# Patient Record
Sex: MALE | Race: WHITE | NOT HISPANIC OR LATINO | Employment: FULL TIME | URBAN - METROPOLITAN AREA
[De-identification: names, ages, dates, MRNs, and addresses within clinical notes are randomized per-mention and may not be internally consistent; named-entity substitution may affect disease eponyms.]

---

## 2020-07-23 DIAGNOSIS — R53.83 OTHER FATIGUE: ICD-10-CM

## 2020-07-23 DIAGNOSIS — R40.0 SOMNOLENCE: ICD-10-CM

## 2020-07-23 DIAGNOSIS — G47.10 HYPERSOMNIA, UNSPECIFIED: Primary | ICD-10-CM

## 2020-07-24 DIAGNOSIS — R53.83 OTHER FATIGUE: ICD-10-CM

## 2020-07-24 DIAGNOSIS — G47.10 HYPERSOMNIA, UNSPECIFIED: Primary | ICD-10-CM

## 2020-07-24 DIAGNOSIS — R40.0 SOMNOLENCE: ICD-10-CM

## 2020-07-24 DIAGNOSIS — Z01.818 OTHER SPECIFIED PRE-OPERATIVE EXAMINATION: ICD-10-CM

## 2020-07-27 DIAGNOSIS — Z01.818 OTHER SPECIFIED PRE-OPERATIVE EXAMINATION: ICD-10-CM

## 2020-08-07 ENCOUNTER — HOSPITAL ENCOUNTER (OUTPATIENT)
Dept: PREADMISSION TESTING | Facility: HOSPITAL | Age: 65
Discharge: HOME | End: 2020-08-07
Attending: OTOLARYNGOLOGY
Payer: COMMERCIAL

## 2020-08-07 DIAGNOSIS — Z01.818 OTHER SPECIFIED PRE-OPERATIVE EXAMINATION: ICD-10-CM

## 2020-08-07 PROCEDURE — U0003 INFECTIOUS AGENT DETECTION BY NUCLEIC ACID (DNA OR RNA); SEVERE ACUTE RESPIRATORY SYNDROME CORONAVIRUS 2 (SARS-COV-2) (CORONAVIRUS DISEASE [COVID-19]), AMPLIFIED PROBE TECHNIQUE, MAKING USE OF HIGH THROUGHPUT TECHNOLOGIES AS DESCRIBED BY CMS-2020-01-R: HCPCS

## 2020-08-08 LAB — SARS-COV-2 RNA RESP QL NAA+PROBE: NOT DETECTED

## 2020-08-10 ENCOUNTER — PROCEDURE VISIT (OUTPATIENT)
Dept: SLEEP MEDICINE | Facility: HOSPITAL | Age: 65
End: 2020-08-10
Attending: OTOLARYNGOLOGY
Payer: COMMERCIAL

## 2020-08-10 DIAGNOSIS — R40.0 SOMNOLENCE: ICD-10-CM

## 2020-08-10 DIAGNOSIS — R53.83 OTHER FATIGUE: ICD-10-CM

## 2020-08-10 DIAGNOSIS — G47.10 HYPERSOMNIA, UNSPECIFIED: ICD-10-CM

## 2020-08-10 PROCEDURE — 95810 POLYSOM 6/> YRS 4/> PARAM: CPT

## 2022-12-07 ENCOUNTER — OCCUPATIONAL HEALTH (OUTPATIENT)
Dept: URGENT CARE | Facility: CLINIC | Age: 67
End: 2022-12-07

## 2022-12-07 DIAGNOSIS — Z02.83 ENCOUNTER FOR DRUG SCREENING: Primary | ICD-10-CM

## 2022-12-07 LAB — BREATH ALCOHOL: 0

## 2022-12-07 PROCEDURE — 80305 MEDTOX HAIR COLLECTION ONLY: ICD-10-PCS | Mod: S$GLB,,, | Performed by: FAMILY MEDICINE

## 2022-12-07 PROCEDURE — 80305 DRUG TEST PRSMV DIR OPT OBS: CPT | Mod: S$GLB,,, | Performed by: FAMILY MEDICINE

## 2022-12-07 PROCEDURE — 82075 ASSAY OF BREATH ETHANOL: CPT | Mod: S$GLB,,, | Performed by: FAMILY MEDICINE

## 2022-12-07 PROCEDURE — 82075 POCT BREATH ALCOHOL TEST: ICD-10-PCS | Mod: S$GLB,,, | Performed by: FAMILY MEDICINE

## 2024-07-30 RX ORDER — GUAIFENESIN 1200 MG
TABLET, EXTENDED RELEASE 12 HR ORAL
COMMUNITY

## 2024-07-30 RX ORDER — TRAMADOL HYDROCHLORIDE 50 MG/1
TABLET ORAL
COMMUNITY

## 2024-07-30 RX ORDER — CEPHALEXIN 500 MG/1
CAPSULE ORAL
COMMUNITY

## 2024-07-30 RX ORDER — GABAPENTIN 300 MG/1
CAPSULE ORAL
COMMUNITY

## 2024-07-30 RX ORDER — AZELASTINE 1 MG/ML
SPRAY, METERED NASAL
COMMUNITY

## 2024-07-30 RX ORDER — ORAL SEMAGLUTIDE 7 MG/1
TABLET ORAL
COMMUNITY

## 2024-07-30 RX ORDER — HYDROCODONE BITARTRATE AND ACETAMINOPHEN 10; 325 MG/1; MG/1
TABLET ORAL
COMMUNITY

## 2024-07-30 RX ORDER — A/SINGAPORE/GP1908/2015 IVR-180 (AN A/MICHIGAN/45/2015 (H1N1)PDM09-LIKE VIRUS, A/HONG KONG/4801/2014, NYMC X-263B (H3N2) (AN A/HONG KONG/4801/2014-LIKE VIRUS), AND B/BRISBANE/60/2008, WILD TYPE (A B/BRISBANE/60/2008-LIKE VIRUS) 15; 15; 15 UG/.5ML; UG/.5ML; UG/.5ML
INJECTION, SUSPENSION INTRAMUSCULAR
COMMUNITY

## 2024-07-30 RX ORDER — PROMETHAZINE HYDROCHLORIDE AND DEXTROMETHORPHAN HYDROBROMIDE 6.25; 15 MG/5ML; MG/5ML
SYRUP ORAL
COMMUNITY

## 2024-07-30 RX ORDER — ATORVASTATIN CALCIUM 10 MG/1
TABLET, FILM COATED ORAL
COMMUNITY

## 2024-07-30 RX ORDER — ALBUTEROL SULFATE 90 UG/1
AEROSOL, METERED RESPIRATORY (INHALATION)
COMMUNITY

## 2024-07-30 RX ORDER — AZITHROMYCIN 250 MG/1
TABLET, FILM COATED ORAL
COMMUNITY

## 2024-07-30 RX ORDER — ESOMEPRAZOLE MAGNESIUM 40 MG/1
CAPSULE, DELAYED RELEASE ORAL
COMMUNITY

## 2024-07-30 RX ORDER — FENOFIBRATE 145 MG/1
TABLET, FILM COATED ORAL
COMMUNITY

## 2024-07-30 RX ORDER — TROSPIUM CHLORIDE 20 MG/1
TABLET, FILM COATED ORAL
COMMUNITY

## 2024-07-30 RX ORDER — SITAGLIPTIN AND METFORMIN HYDROCHLORIDE 500; 50 MG/1; MG/1
TABLET, FILM COATED ORAL
COMMUNITY

## 2024-07-30 RX ORDER — METFORMIN HYDROCHLORIDE 500 MG/1
TABLET, EXTENDED RELEASE ORAL
COMMUNITY

## 2024-07-30 RX ORDER — AMOXICILLIN 500 MG/1
TABLET, FILM COATED ORAL
COMMUNITY

## 2024-07-30 RX ORDER — CETIRIZINE HYDROCHLORIDE 10 MG/1
TABLET ORAL
COMMUNITY

## 2024-07-31 ENCOUNTER — HOSPITAL ENCOUNTER (OUTPATIENT)
Dept: RADIOLOGY | Facility: HOSPITAL | Age: 69
Discharge: HOME | End: 2024-07-31
Attending: INTERNAL MEDICINE
Payer: COMMERCIAL

## 2024-07-31 ENCOUNTER — OFFICE VISIT (OUTPATIENT)
Dept: PULMONOLOGY | Facility: CLINIC | Age: 69
End: 2024-07-31
Payer: COMMERCIAL

## 2024-07-31 VITALS
WEIGHT: 240.31 LBS | OXYGEN SATURATION: 98 % | DIASTOLIC BLOOD PRESSURE: 80 MMHG | SYSTOLIC BLOOD PRESSURE: 140 MMHG | HEART RATE: 85 BPM

## 2024-07-31 DIAGNOSIS — R05.3 CHRONIC COUGH: ICD-10-CM

## 2024-07-31 DIAGNOSIS — R05.9 COUGH, UNSPECIFIED TYPE: ICD-10-CM

## 2024-07-31 DIAGNOSIS — J32.9 CHRONIC SINUSITIS, UNSPECIFIED LOCATION: ICD-10-CM

## 2024-07-31 DIAGNOSIS — J34.89 OTHER SPECIFIED DISORDERS OF NOSE AND NASAL SINUSES: Primary | ICD-10-CM

## 2024-07-31 DIAGNOSIS — T78.40XA ALLERGY, INITIAL ENCOUNTER: ICD-10-CM

## 2024-07-31 DIAGNOSIS — G47.33 OSA (OBSTRUCTIVE SLEEP APNEA): ICD-10-CM

## 2024-07-31 PROCEDURE — 71046 X-RAY EXAM CHEST 2 VIEWS: CPT | Mod: 26,,, | Performed by: RADIOLOGY

## 2024-07-31 PROCEDURE — 71046 X-RAY EXAM CHEST 2 VIEWS: CPT | Mod: TC

## 2024-07-31 RX ORDER — PANTOPRAZOLE SODIUM 20 MG/1
20 TABLET, DELAYED RELEASE ORAL
COMMUNITY
Start: 2024-07-18

## 2024-07-31 RX ORDER — PIOGLITAZONEHYDROCHLORIDE 30 MG/1
30 TABLET ORAL
COMMUNITY
Start: 2024-07-05

## 2024-07-31 RX ORDER — COLESEVELAM 180 1/1
1250 TABLET ORAL 2 TIMES DAILY
COMMUNITY
Start: 2024-07-05

## 2024-07-31 RX ORDER — DAPAGLIFLOZIN 10 MG/1
10 TABLET, FILM COATED ORAL
COMMUNITY
Start: 2024-07-26

## 2024-07-31 RX ORDER — CODEINE PHOSPHATE AND GUAIFENESIN 10; 100 MG/5ML; MG/5ML
SOLUTION ORAL
COMMUNITY
Start: 2024-06-06

## 2024-07-31 RX ORDER — MELOXICAM 15 MG/1
15 TABLET ORAL DAILY PRN
COMMUNITY
Start: 2024-02-27

## 2024-07-31 RX ORDER — BUDESONIDE, GLYCOPYRROLATE, AND FORMOTEROL FUMARATE 160; 9; 4.8 UG/1; UG/1; UG/1
AEROSOL, METERED RESPIRATORY (INHALATION)
COMMUNITY
Start: 2024-06-06

## 2024-07-31 NOTE — ASSESSMENT & PLAN NOTE
Has postnasal drip  Continue antihistamine  Check CT sinus to ro obstruction or polyps  Check PFT, methacholine challenge  Check CBC with diff, IgE  Trial of reflux treatment x 2 weeks - call me then  RTC 1 month

## 2024-07-31 NOTE — PROGRESS NOTES
New Office Visit/Consultation Note *    Patient Name: Timbo Torres  MRN: [id number]  : 1955      Reason for visit: Cough, chronic     HPI:     2024 - He had URI in December and since then has had cough,  it has been stable over this period but not better.  He has some phlegm in the AM but mostly nonproductive.  He has postnasal drip (takes claritin most days, no nasal sprays).  No h/o asthma.  He did take allergy shots when he was younger.  No h/o smoking.  He has not had any recent CXR.  He has been on several inhalers with no help, he has not been on steroids but has noted some SOB with exertion.  No prior similar episode.  He does have Meenu and has CPAP but has trouble tolerating nCPAP due to nasal congestion.  He did have Covid about 2 months ago and was treated with paxlovid.      Past Medical History    Past Medical History:   Diagnosis Date    Chronic sinusitis, unspecified     CKD (chronic kidney disease)     Diabetes mellitus     Mixed hyperlipidemia     Sleep apnea, unspecified        Past Surgical History    Past Surgical History:   Procedure Laterality Date    CHOLECYSTECTOMY      ROTATOR CUFF REPAIR Left     TOTAL HIP ARTHROPLASTY Right     UVULOPALATOPHARYNGOPLASTY         Medications      Current Outpatient Medications:     albuterol (PROVENTIL/VENTOLIN HFA) 90 mcg/actuation inhaler, albuterol sulfate HFA 90 mcg/actuation aerosol inhaler, Disp: , Rfl:     colesevelam (WELCHOL) 625 mg tablet, Take 1,250 mg by mouth 2 (two) times daily., Disp: , Rfl:     FARXIGA 10 mg tablet, Take 10 mg by mouth., Disp: , Rfl:     fenofibrate (TRICOR) 145 MG tablet, fenofibrate nanocrystallized 145 mg tablet, Disp: , Rfl:     pioglitazone (ACTOS) 30 MG tablet, Take 30 mg by mouth., Disp: , Rfl:     semaglutide (RYBELSUS) 7 mg tablet, Rybelsus 7 mg tablet, Disp: , Rfl:     acetaminophen 325 mg Cap, acetaminophen 325 mg capsule  Take 2 capsules every 4-6 hours by oral route. (Patient not taking:  Reported on 7/31/2024), Disp: , Rfl:     amoxicillin (AMOXIL) 500 MG Tab, amoxicillin 500 mg tablet  TK 1 T PO  Q 8 H TAT (Patient not taking: Reported on 7/31/2024), Disp: , Rfl:     atorvastatin (LIPITOR) 10 MG tablet, atorvastatin 10 mg tablet (Patient not taking: Reported on 7/31/2024), Disp: , Rfl:     azelastine (ASTELIN) 137 mcg (0.1 %) nasal spray, azelastine 137 mcg (0.1 %) nasal spray aerosol  USE 1 SPRAY IN EACH NOSTRIL TWICE DAILY (Patient not taking: Reported on 7/31/2024), Disp: , Rfl:     azithromycin (Z-WENDY) 250 MG tablet, azithromycin 250 mg tablet (Patient not taking: Reported on 7/31/2024), Disp: , Rfl:     BREZTRI AEROSPHERE 160-9-4.8 mcg/actuation HFAA, Inhale into the lungs. (Patient not taking: Reported on 7/31/2024), Disp: , Rfl:     cephALEXin (KEFLEX) 500 MG capsule, cephalexin 500 mg capsule (Patient not taking: Reported on 7/31/2024), Disp: , Rfl:     cetirizine (ZYRTEC) 10 MG tablet, cetirizine 10 mg tablet  TAKE 1 TABLET BY MOUTH EVERY EVENING (Patient not taking: Reported on 7/31/2024), Disp: , Rfl:     esomeprazole (NEXIUM) 40 MG capsule, esomeprazole magnesium 40 mg capsule,delayed release (Patient not taking: Reported on 7/31/2024), Disp: , Rfl:     flu vac 2020 65up-[id number],PF, (FLUAD QUAD 2020-21,65Y UP,,PF,) 60 mcg (15 mcg x 4)/0.5 mL Syrg, Fluad Quad 1535-2651(65yr up)(PF) 60 mcg (15 mcg x 4)/0.5mL IM syringe (Patient not taking: Reported on 7/31/2024), Disp: , Rfl:     gabapentin (NEURONTIN) 300 MG capsule, gabapentin 300 mg capsule (Patient not taking: Reported on 7/31/2024), Disp: , Rfl:     guaiFENesin-codeine 100-10 mg/5 ml (TUSSI-ORGANIDIN NR)  mg/5 mL syrup, Take by mouth. (Patient not taking: Reported on 7/31/2024), Disp: , Rfl:     HYDROcodone-acetaminophen (NORCO)  mg per tablet, hydrocodone 10 mg-acetaminophen 325 mg tablet (Patient not taking: Reported on 7/31/2024), Disp: , Rfl:     meloxicam (MOBIC) 15 MG tablet, Take 15 mg by mouth daily as needed.  (Patient not taking: Reported on 7/31/2024), Disp: , Rfl:     metFORMIN (GLUCOPHAGE-XR) 500 MG ER 24hr tablet, metformin  mg tablet,extended release 24 hr (Patient not taking: Reported on 7/31/2024), Disp: , Rfl:     pantoprazole (PROTONIX) 20 MG tablet, Take 20 mg by mouth. (Patient not taking: Reported on 7/31/2024), Disp: , Rfl:     promethazine-dextromethorphan (PROMETHAZINE-DM) 6.25-15 mg/5 mL Syrp, promethazine-DM 6.25 mg-15 mg/5 mL oral syrup  TAKE 5 ML BY MOUTH EVERY 6 TO 8 HOURS AS NEEDED FOR COUGH (Patient not taking: Reported on 7/31/2024), Disp: , Rfl:     SITagliptan-metformin (JANUMET)  mg per tablet, Janumet 50 mg-500 mg tablet (Patient not taking: Reported on 7/31/2024), Disp: , Rfl:     traMADoL (ULTRAM) 50 mg tablet, tramadol 50 mg tablet (Patient not taking: Reported on 7/31/2024), Disp: , Rfl:     trospium (SANCTURA) 20 mg Tab tablet, trospium 20 mg tablet (Patient not taking: Reported on 7/31/2024), Disp: , Rfl:     Allergies    Review of patient's allergies indicates:  No Known Allergies    SocHx    Social History     Tobacco Use   Smoking Status Never   Smokeless Tobacco Never       Social History     Substance and Sexual Activity   Alcohol Use Not Currently       Drug Use - no  Occupation -   Asbestos exposure - yes  Pets - dog    FMHx    No family history on file.      Review of Systems  Review of Systems   Constitutional:  Negative for chills, diaphoresis, fever, malaise/fatigue and weight loss.        + sleep apnea   HENT:  Positive for congestion.         S/p UPPP   Eyes:  Negative for pain.   Respiratory:  Positive for cough, sputum production and shortness of breath. Negative for hemoptysis, wheezing and stridor.    Cardiovascular:  Negative for chest pain, palpitations, orthopnea, claudication, leg swelling and PND.   Gastrointestinal:  Negative for abdominal pain, constipation, diarrhea, heartburn, nausea and vomiting.   Genitourinary:  Negative for dysuria,  frequency and urgency.   Musculoskeletal:  Negative for falls and myalgias.   Neurological:  Negative for sensory change, focal weakness and weakness.   Psychiatric/Behavioral:  Negative for depression, substance abuse and suicidal ideas. The patient is not nervous/anxious.        Physical Exam    Vitals:    07/31/24 0910   BP: (!) 140/80   BP Location: Left arm   Patient Position: Sitting   BP Method: Medium (Manual)   Pulse: 85   SpO2: 98%   Weight: 109 kg (240 lb 4.8 oz)       Physical Exam  Vitals and nursing note reviewed.   Constitutional:       General: He is not in acute distress.     Appearance: Normal appearance. He is well-developed. He is not ill-appearing, toxic-appearing or diaphoretic.   HENT:      Head: Normocephalic and atraumatic.      Right Ear: External ear normal.      Left Ear: External ear normal.      Nose: Congestion present.      Comments: + postnasal drip     Mouth/Throat:      Mouth: Mucous membranes are moist.      Pharynx: Oropharynx is clear.      Comments: S/p UPPP  Eyes:      General: No scleral icterus.        Right eye: No discharge.         Left eye: No discharge.      Extraocular Movements: Extraocular movements intact.      Conjunctiva/sclera: Conjunctivae normal.      Pupils: Pupils are equal, round, and reactive to light.   Neck:      Thyroid: No thyromegaly.      Vascular: No JVD.      Trachea: No tracheal deviation.   Cardiovascular:      Rate and Rhythm: Normal rate and regular rhythm.      Heart sounds: Normal heart sounds. No murmur heard.     No friction rub. No gallop.   Pulmonary:      Effort: Pulmonary effort is normal. No respiratory distress.      Breath sounds: Normal breath sounds. No stridor. No wheezing, rhonchi or rales.   Chest:      Chest wall: No tenderness.   Abdominal:      General: Bowel sounds are normal. There is no distension.      Palpations: Abdomen is soft.      Tenderness: There is no abdominal tenderness. There is no guarding.   Musculoskeletal:    "      General: No tenderness. Normal range of motion.      Cervical back: Normal range of motion and neck supple. No rigidity or tenderness.      Right lower leg: No edema.      Left lower leg: No edema.   Lymphadenopathy:      Cervical: No cervical adenopathy.   Skin:     General: Skin is warm.      Capillary Refill: Capillary refill takes less than 2 seconds.   Neurological:      General: No focal deficit present.      Mental Status: He is alert and oriented to person, place, and time. Mental status is at baseline.      Cranial Nerves: No cranial nerve deficit.      Motor: No weakness.      Gait: Gait normal.      Deep Tendon Reflexes: Reflexes are normal and symmetric.   Psychiatric:         Mood and Affect: Mood normal.         Behavior: Behavior normal.         Thought Content: Thought content normal.         Judgment: Judgment normal.         Labs    No results found for: "WBC", "HGB", "HCT", "PLT"    No results found for: "NA", "K", "CL", "CO2", "GLU", "BUN", "CREATININE", "CALCIUM", "PROT", "ALBUMIN", "BILITOT", "ALKPHOS", "AST", "ALT", "ANIONGAP"    Xrays        Impression/Plan    Problem List Items Addressed This Visit          Pulmonary    Chronic cough     Has postnasal drip  Continue antihistamine  Check CT sinus to ro obstruction or polyps  Check PFT, methacholine challenge  Check CBC with diff, IgE  Trial of reflux treatment x 2 weeks - call me then  RTC 1 month            Other    HOA (obstructive sleep apnea)     Will follow  Ideally we want to get him back on his therapy         Allergy     Has known allergies  May need further evaluation          Other Visit Diagnoses       Other specified disorders of nose and nasal sinuses    -  Primary    Relevant Orders    CT Sinuses without Contrast    Chronic sinusitis, unspecified location        Relevant Orders    CT Sinuses without Contrast    CBC W/ AUTO DIFFERENTIAL    IgE    Cough, unspecified type        Relevant Orders    Complete PFT w/ " bronchodilator    Bronchial challenge with methacholine    X-Ray Chest PA And Lateral            I have spent about 45 minutes with the patient taking the history and examining the patient.  We have discussed the diagnoses and current plan and all questions have been answered.  We have discussed the follow up plan.  The patient and family (if present) know to contact the office with any questions they may have.        Bulmaro Montoya MD

## 2024-08-07 ENCOUNTER — HOSPITAL ENCOUNTER (OUTPATIENT)
Dept: PULMONOLOGY | Facility: HOSPITAL | Age: 69
Discharge: HOME | End: 2024-08-07
Attending: INTERNAL MEDICINE
Payer: COMMERCIAL

## 2024-08-07 ENCOUNTER — HOSPITAL ENCOUNTER (OUTPATIENT)
Dept: RADIOLOGY | Facility: HOSPITAL | Age: 69
Discharge: HOME | End: 2024-08-07
Attending: INTERNAL MEDICINE
Payer: COMMERCIAL

## 2024-08-07 DIAGNOSIS — J32.9 CHRONIC SINUSITIS, UNSPECIFIED LOCATION: ICD-10-CM

## 2024-08-07 DIAGNOSIS — J34.89 OTHER SPECIFIED DISORDERS OF NOSE AND NASAL SINUSES: ICD-10-CM

## 2024-08-07 DIAGNOSIS — R05.9 COUGH, UNSPECIFIED TYPE: ICD-10-CM

## 2024-08-07 LAB
DLCO SINGLE BREATH LLN: 21.94
DLCO SINGLE BREATH PRE REF: 65.3 %
DLCO SINGLE BREATH REF: 28.87
DLCOC SBVA LLN: 2.74
DLCOC SBVA REF: 3.83
DLCOC SINGLE BREATH LLN: 21.94
DLCOC SINGLE BREATH REF: 28.87
DLCOVA LLN: 2.74
DLCOVA PRE REF: 90.3 %
DLCOVA PRE: 3.46 ML/(MIN*MMHG*L) (ref 2.74–4.93)
DLCOVA REF: 3.83
ERV LLN: -16448.89
ERV PRE REF: 62.3 %
ERV REF: 1.11
FEF 25 75 CHG: -6.9 %
FEF 25 75 LLN: 1.53
FEF 25 75 POST REF: 88.8 %
FEF 25 75 PRE REF: 95.4 %
FEF 25 75 REF: 3.24
FET100 CHG: 22.7 %
FEV1 CHG: 1.4 %
FEV1 FVC CHG: -3.1 %
FEV1 FVC LLN: 62
FEV1 FVC POST REF: 100.8 %
FEV1 FVC PRE REF: 104 %
FEV1 FVC REF: 76
FEV1 LLN: 2.44
FEV1 POST REF: 98.1 %
FEV1 PRE REF: 96.7 %
FEV1 REF: 3.39
FRCPLETH LLN: 2.83
FRCPLETH PREREF: 82.6 %
FRCPLETH REF: 3.82
FVC CHG: 4.6 %
FVC LLN: 3.34
FVC POST REF: 96.8 %
FVC PRE REF: 92.5 %
FVC REF: 4.52
IVC PRE: 4.04 L (ref 3.34–5.7)
IVC SINGLE BREATH LLN: 3.34
IVC SINGLE BREATH PRE REF: 89.4 %
IVC SINGLE BREATH REF: 4.52
MVV LLN: 114
MVV PRE REF: 72.1 %
MVV REF: 134
PEF CHG: 1 %
PEF LLN: 6.37
PEF POST REF: 62.6 %
PEF PRE REF: 62 %
PEF REF: 8.82
POST FEF 25 75: 2.88 L/S (ref 1.53–4.95)
POST FET 100: 10.54 SEC
POST FEV1 FVC: 76.11 % (ref 62.08–87.44)
POST FEV1: 3.33 L (ref 2.44–4.28)
POST FVC: 4.37 L (ref 3.34–5.7)
POST PEF: 5.51 L/S (ref 6.37–11.27)
PRE DLCO: 18.85 ML/(MIN*MMHG) (ref 21.94–35.8)
PRE ERV: 0.69 L (ref -16448.89–16451.11)
PRE FEF 25 75: 3.09 L/S (ref 1.53–4.95)
PRE FET 100: 8.59 SEC
PRE FEV1 FVC: 78.52 % (ref 62.08–87.44)
PRE FEV1: 3.28 L (ref 2.44–4.28)
PRE FRC PL: 3.15 L (ref 2.83–4.81)
PRE FVC: 4.18 L (ref 3.34–5.7)
PRE MVV: 96.4 L/MIN (ref 113.69–153.82)
PRE PEF: 5.46 L/S (ref 6.37–11.27)
PRE RV: 2.46 L (ref 2.03–3.38)
PRE TLC: 6.64 L (ref 6.38–8.68)
RAW LLN: 3.06
RAW PRE REF: 22.2 %
RAW PRE: 0.68 CMH2O*S/L (ref 3.06–3.06)
RAW REF: 3.06
RV LLN: 2.03
RV PRE REF: 91 %
RV REF: 2.71
RVTLC LLN: 32
RVTLC PRE REF: 89.9 %
RVTLC PRE: 37.09 % (ref 32.28–50.24)
RVTLC REF: 41
TLC LLN: 6.38
TLC PRE REF: 88.1 %
TLC REF: 7.53
VA PRE: 5.45 L (ref 7.38–7.38)
VA SINGLE BREATH LLN: 7.38
VA SINGLE BREATH PRE REF: 73.8 %
VA SINGLE BREATH REF: 7.38
VC LLN: 3.34
VC PRE REF: 92.5 %
VC PRE: 4.18 L (ref 3.34–5.7)
VC REF: 4.52

## 2024-08-07 PROCEDURE — 70486 CT MAXILLOFACIAL W/O DYE: CPT | Mod: TC

## 2024-08-07 PROCEDURE — 94726 PLETHYSMOGRAPHY LUNG VOLUMES: CPT

## 2024-08-07 PROCEDURE — 94060 EVALUATION OF WHEEZING: CPT | Mod: 26,,, | Performed by: INTERNAL MEDICINE

## 2024-08-07 PROCEDURE — 70486 CT MAXILLOFACIAL W/O DYE: CPT | Mod: 26,,, | Performed by: RADIOLOGY

## 2024-08-07 PROCEDURE — 94729 DIFFUSING CAPACITY: CPT | Mod: 26,,, | Performed by: INTERNAL MEDICINE

## 2024-08-07 PROCEDURE — 94726 PLETHYSMOGRAPHY LUNG VOLUMES: CPT | Mod: 26,,, | Performed by: INTERNAL MEDICINE

## 2024-08-07 PROCEDURE — 94729 DIFFUSING CAPACITY: CPT

## 2024-08-07 PROCEDURE — 94060 EVALUATION OF WHEEZING: CPT

## 2024-08-07 RX ORDER — ALBUTEROL SULFATE 2.5 MG/.5ML
SOLUTION RESPIRATORY (INHALATION)
Status: DISPENSED
Start: 2024-08-07 | End: 2024-08-07

## 2024-08-14 ENCOUNTER — HOSPITAL ENCOUNTER (OUTPATIENT)
Dept: PULMONOLOGY | Facility: HOSPITAL | Age: 69
Discharge: HOME | End: 2024-08-14
Attending: INTERNAL MEDICINE
Payer: COMMERCIAL

## 2024-08-14 VITALS — OXYGEN SATURATION: 99 % | RESPIRATION RATE: 18 BRPM | HEART RATE: 76 BPM

## 2024-08-14 DIAGNOSIS — R05.9 COUGH, UNSPECIFIED TYPE: ICD-10-CM

## 2024-08-14 PROCEDURE — 63600175 PHARM REV CODE 636 W HCPCS: Performed by: INTERNAL MEDICINE

## 2024-08-14 PROCEDURE — 94070 EVALUATION OF WHEEZING: CPT

## 2024-08-14 RX ORDER — METHACHOLINE CHLORIDE 48 MG/3 ML
3 VIAL, NEBULIZER (ML) INHALATION ONCE
Status: COMPLETED | OUTPATIENT
Start: 2024-08-14 | End: 2024-08-14

## 2024-08-14 RX ORDER — METHACHOLINE CHLORIDE 0 MG/3 ML
3 VIAL, NEBULIZER (ML) INHALATION ONCE
Status: COMPLETED | OUTPATIENT
Start: 2024-08-14 | End: 2024-08-14

## 2024-08-14 RX ORDER — METHACHOLINE CHLORIDE 3 MG/3 ML
3 VIAL, NEBULIZER (ML) INHALATION ONCE
Status: COMPLETED | OUTPATIENT
Start: 2024-08-14 | End: 2024-08-14

## 2024-08-14 RX ORDER — METHACHOLINE CHLORIDE 0.75/3ML
3 VIAL, NEBULIZER (ML) INHALATION ONCE
Status: COMPLETED | OUTPATIENT
Start: 2024-08-14 | End: 2024-08-14

## 2024-08-14 RX ORDER — METHACHOLINE CHLORIDE 12 MG/3 ML
3 VIAL, NEBULIZER (ML) INHALATION ONCE
Status: COMPLETED | OUTPATIENT
Start: 2024-08-14 | End: 2024-08-14

## 2024-08-14 RX ORDER — METHACHOLINE CHLORIDE 0.1875/3ML
3 VIAL, NEBULIZER (ML) INHALATION ONCE
Status: COMPLETED | OUTPATIENT
Start: 2024-08-14 | End: 2024-08-14

## 2024-08-14 RX ADMIN — Medication 0.19 MG: at 02:08

## 2024-08-14 RX ADMIN — METHACHOLINE CHLORIDE INHALATION SOLUTION 3 ML: KIT at 02:08

## 2024-08-14 RX ADMIN — Medication 0.75 MG: at 02:08

## 2024-08-14 RX ADMIN — Medication 3 MG: at 02:08

## 2024-08-14 RX ADMIN — Medication 12 MG: at 02:08

## 2024-08-14 RX ADMIN — Medication 48 MG: at 02:08

## 2024-09-04 ENCOUNTER — OFFICE VISIT (OUTPATIENT)
Dept: PULMONOLOGY | Facility: CLINIC | Age: 69
End: 2024-09-04
Payer: COMMERCIAL

## 2024-09-04 VITALS
HEART RATE: 85 BPM | SYSTOLIC BLOOD PRESSURE: 150 MMHG | WEIGHT: 242 LBS | DIASTOLIC BLOOD PRESSURE: 80 MMHG | OXYGEN SATURATION: 99 %

## 2024-09-04 DIAGNOSIS — R05.3 CHRONIC COUGH: Primary | ICD-10-CM

## 2024-09-04 DIAGNOSIS — G47.33 OSA (OBSTRUCTIVE SLEEP APNEA): ICD-10-CM

## 2024-09-04 PROCEDURE — 99214 OFFICE O/P EST MOD 30 MIN: CPT | Mod: S$GLB,,, | Performed by: INTERNAL MEDICINE

## 2024-09-04 PROCEDURE — 1160F RVW MEDS BY RX/DR IN RCRD: CPT | Mod: CPTII,S$GLB,, | Performed by: INTERNAL MEDICINE

## 2024-09-04 PROCEDURE — 1159F MED LIST DOCD IN RCRD: CPT | Mod: CPTII,S$GLB,, | Performed by: INTERNAL MEDICINE

## 2024-09-04 PROCEDURE — 1126F AMNT PAIN NOTED NONE PRSNT: CPT | Mod: CPTII,S$GLB,, | Performed by: INTERNAL MEDICINE

## 2024-09-04 PROCEDURE — 1101F PT FALLS ASSESS-DOCD LE1/YR: CPT | Mod: CPTII,S$GLB,, | Performed by: INTERNAL MEDICINE

## 2024-09-04 PROCEDURE — 3079F DIAST BP 80-89 MM HG: CPT | Mod: CPTII,S$GLB,, | Performed by: INTERNAL MEDICINE

## 2024-09-04 PROCEDURE — 3288F FALL RISK ASSESSMENT DOCD: CPT | Mod: CPTII,S$GLB,, | Performed by: INTERNAL MEDICINE

## 2024-09-04 PROCEDURE — 3077F SYST BP >= 140 MM HG: CPT | Mod: CPTII,S$GLB,, | Performed by: INTERNAL MEDICINE

## 2024-09-04 RX ORDER — ALBUTEROL SULFATE 90 UG/1
2 INHALANT RESPIRATORY (INHALATION) EVERY 6 HOURS PRN
Qty: 18 G | Refills: 5 | Status: SHIPPED | OUTPATIENT
Start: 2024-09-04

## 2024-09-04 RX ORDER — BUDESONIDE AND FORMOTEROL FUMARATE DIHYDRATE 160; 4.5 UG/1; UG/1
2 AEROSOL RESPIRATORY (INHALATION) EVERY 12 HOURS
Qty: 10.2 G | Refills: 5 | Status: SHIPPED | OUTPATIENT
Start: 2024-09-04 | End: 2025-09-04

## 2024-09-04 RX ORDER — PREDNISONE 10 MG/1
TABLET ORAL
Qty: 30 TABLET | Refills: 0 | Status: SHIPPED | OUTPATIENT
Start: 2024-09-04

## 2024-09-04 NOTE — ASSESSMENT & PLAN NOTE
Studies are c/w cough variant asthma  Trial symbicort and prn albuterol  Prednisone taper  He is traveling to [place] next week  RTC 1 month

## 2024-09-04 NOTE — PROGRESS NOTES
Office Visit Note *    Patient Name: Timbo Torres  MRN: [id number]  : 1955      Reason for visit: Cough, chronic     HPI:     2024 - He had URI in December and since then has had cough,  it has been stable over this period but not better.  He has some phlegm in the AM but mostly nonproductive.  He has postnasal drip (takes claritin most days, no nasal sprays).  No h/o asthma.  He did take allergy shots when he was younger.  No h/o smoking.  He has not had any recent CXR.  He has been on several inhalers with no help, he has not been on steroids but has noted some SOB with exertion.  No prior similar episode.  He does have Meenu and has CPAP but has trouble tolerating nCPAP due to nasal congestion.  He did have Covid about 2 months ago and was treated with paxlovid.      2024 - Here to go over tests, IgE normal, CBC OK, CT siuses with some inflammation, PF with mild restriction and mild decrease DLCO, + methacholine challenge and he noted increased cough during and after the test.  He has tried BREO without help.     Past Medical History    Past Medical History:   Diagnosis Date    Chronic sinusitis, unspecified     CKD (chronic kidney disease)     Diabetes mellitus     Mixed hyperlipidemia     Sleep apnea, unspecified        Past Surgical History    Past Surgical History:   Procedure Laterality Date    CHOLECYSTECTOMY      ROTATOR CUFF REPAIR Left     TOTAL HIP ARTHROPLASTY Right     UVULOPALATOPHARYNGOPLASTY         Medications      Current Outpatient Medications:     colesevelam (WELCHOL) 625 mg tablet, Take 1,250 mg by mouth 2 (two) times daily., Disp: , Rfl:     FARXIGA 10 mg tablet, Take 10 mg by mouth., Disp: , Rfl:     fenofibrate (TRICOR) 145 MG tablet, fenofibrate nanocrystallized 145 mg tablet, Disp: , Rfl:     pioglitazone (ACTOS) 30 MG tablet, Take 30 mg by mouth., Disp: , Rfl:     semaglutide (RYBELSUS) 7 mg tablet, Rybelsus 7 mg tablet, Disp: , Rfl:     acetaminophen 325 mg Cap,  acetaminophen 325 mg capsule  Take 2 capsules every 4-6 hours by oral route. (Patient not taking: Reported on 7/31/2024), Disp: , Rfl:     albuterol (VENTOLIN HFA) 90 mcg/actuation inhaler, Inhale 2 puffs into the lungs every 6 (six) hours as needed for Wheezing. Rescue, Disp: 18 g, Rfl: 5    amoxicillin (AMOXIL) 500 MG Tab, amoxicillin 500 mg tablet  TK 1 T PO  Q 8 H TAT (Patient not taking: Reported on 7/31/2024), Disp: , Rfl:     atorvastatin (LIPITOR) 10 MG tablet, atorvastatin 10 mg tablet (Patient not taking: Reported on 7/31/2024), Disp: , Rfl:     azelastine (ASTELIN) 137 mcg (0.1 %) nasal spray, azelastine 137 mcg (0.1 %) nasal spray aerosol  USE 1 SPRAY IN EACH NOSTRIL TWICE DAILY (Patient not taking: Reported on 7/31/2024), Disp: , Rfl:     azithromycin (Z-WENDY) 250 MG tablet, azithromycin 250 mg tablet (Patient not taking: Reported on 7/31/2024), Disp: , Rfl:     budesonide-formoterol 160-4.5 mcg (SYMBICORT) 160-4.5 mcg/actuation HFAA, Inhale 2 puffs into the lungs every 12 (twelve) hours. Controller, Disp: 10.2 g, Rfl: 5    cephALEXin (KEFLEX) 500 MG capsule, cephalexin 500 mg capsule (Patient not taking: Reported on 7/31/2024), Disp: , Rfl:     cetirizine (ZYRTEC) 10 MG tablet, cetirizine 10 mg tablet  TAKE 1 TABLET BY MOUTH EVERY EVENING (Patient not taking: Reported on 7/31/2024), Disp: , Rfl:     esomeprazole (NEXIUM) 40 MG capsule, esomeprazole magnesium 40 mg capsule,delayed release (Patient not taking: Reported on 7/31/2024), Disp: , Rfl:     flu vac 2020 65up-[id number],PF, (FLUAD QUAD 2020-21,65Y UP,,PF,) 60 mcg (15 mcg x 4)/0.5 mL Syrg, Fluad Quad [id number](65yr up)(PF) 60 mcg (15 mcg x 4)/0.5mL IM syringe (Patient not taking: Reported on 7/31/2024), Disp: , Rfl:     gabapentin (NEURONTIN) 300 MG capsule, gabapentin 300 mg capsule (Patient not taking: Reported on 7/31/2024), Disp: , Rfl:     guaiFENesin-codeine 100-10 mg/5 ml (TUSSI-ORGANIDIN NR)  mg/5 mL syrup, Take by mouth. (Patient not  taking: Reported on 7/31/2024), Disp: , Rfl:     HYDROcodone-acetaminophen (NORCO)  mg per tablet, hydrocodone 10 mg-acetaminophen 325 mg tablet (Patient not taking: Reported on 7/31/2024), Disp: , Rfl:     meloxicam (MOBIC) 15 MG tablet, Take 15 mg by mouth daily as needed. (Patient not taking: Reported on 7/31/2024), Disp: , Rfl:     metFORMIN (GLUCOPHAGE-XR) 500 MG ER 24hr tablet, metformin  mg tablet,extended release 24 hr (Patient not taking: Reported on 7/31/2024), Disp: , Rfl:     pantoprazole (PROTONIX) 20 MG tablet, Take 20 mg by mouth. (Patient not taking: Reported on 7/31/2024), Disp: , Rfl:     predniSONE (DELTASONE) 10 MG tablet, Take 3 a day x 5 days then 2 a day x 5 days then 1 a day x 5 days, Disp: 30 tablet, Rfl: 0    promethazine-dextromethorphan (PROMETHAZINE-DM) 6.25-15 mg/5 mL Syrp, promethazine-DM 6.25 mg-15 mg/5 mL oral syrup  TAKE 5 ML BY MOUTH EVERY 6 TO 8 HOURS AS NEEDED FOR COUGH (Patient not taking: Reported on 7/31/2024), Disp: , Rfl:     SITagliptan-metformin (JANUMET)  mg per tablet, Janumet 50 mg-500 mg tablet (Patient not taking: Reported on 7/31/2024), Disp: , Rfl:     traMADoL (ULTRAM) 50 mg tablet, tramadol 50 mg tablet (Patient not taking: Reported on 7/31/2024), Disp: , Rfl:     trospium (SANCTURA) 20 mg Tab tablet, trospium 20 mg tablet (Patient not taking: Reported on 7/31/2024), Disp: , Rfl:     Allergies    Review of patient's allergies indicates:  No Known Allergies    SocHx    Social History     Tobacco Use   Smoking Status Never   Smokeless Tobacco Never       Social History     Substance and Sexual Activity   Alcohol Use Not Currently       Drug Use - no  Occupation -   Asbestos exposure - yes  Pets - dog    FMHx    No family history on file.      Review of Systems  Review of Systems   Constitutional:  Negative for chills, diaphoresis, fever, malaise/fatigue and weight loss.        + sleep apnea   HENT:  Positive for congestion.         S/p  UPPP   Eyes:  Negative for pain.   Respiratory:  Positive for cough, sputum production and shortness of breath. Negative for hemoptysis, wheezing and stridor.    Cardiovascular:  Negative for chest pain, palpitations, orthopnea, claudication, leg swelling and PND.   Gastrointestinal:  Negative for abdominal pain, constipation, diarrhea, heartburn, nausea and vomiting.   Genitourinary:  Negative for dysuria, frequency and urgency.   Musculoskeletal:  Negative for falls and myalgias.   Neurological:  Negative for sensory change, focal weakness and weakness.   Psychiatric/Behavioral:  Negative for depression, substance abuse and suicidal ideas. The patient is not nervous/anxious.        Physical Exam    Vitals:    09/04/24 1306   BP: (!) 150/80   BP Location: Left arm   Patient Position: Sitting   BP Method: Medium (Manual)   Pulse: 85   SpO2: 99%   Weight: 109.8 kg (242 lb)       Physical Exam  Vitals and nursing note reviewed.   Constitutional:       General: He is not in acute distress.     Appearance: Normal appearance. He is well-developed. He is not ill-appearing, toxic-appearing or diaphoretic.   HENT:      Head: Normocephalic and atraumatic.      Right Ear: External ear normal.      Left Ear: External ear normal.      Nose: Congestion present.      Comments: + postnasal drip     Mouth/Throat:      Mouth: Mucous membranes are moist.      Pharynx: Oropharynx is clear.      Comments: S/p UPPP  Eyes:      General: No scleral icterus.        Right eye: No discharge.         Left eye: No discharge.      Extraocular Movements: Extraocular movements intact.      Conjunctiva/sclera: Conjunctivae normal.      Pupils: Pupils are equal, round, and reactive to light.   Neck:      Thyroid: No thyromegaly.      Vascular: No JVD.      Trachea: No tracheal deviation.   Cardiovascular:      Rate and Rhythm: Normal rate and regular rhythm.      Heart sounds: Normal heart sounds. No murmur heard.     No friction rub. No gallop.  "  Pulmonary:      Effort: Pulmonary effort is normal. No respiratory distress.      Breath sounds: Normal breath sounds. No stridor. No wheezing, rhonchi or rales.   Chest:      Chest wall: No tenderness.   Abdominal:      General: Bowel sounds are normal. There is no distension.      Palpations: Abdomen is soft.      Tenderness: There is no abdominal tenderness. There is no guarding.   Musculoskeletal:         General: No tenderness. Normal range of motion.      Cervical back: Normal range of motion and neck supple. No rigidity or tenderness.      Right lower leg: No edema.      Left lower leg: No edema.   Lymphadenopathy:      Cervical: No cervical adenopathy.   Skin:     General: Skin is warm.      Capillary Refill: Capillary refill takes less than 2 seconds.   Neurological:      General: No focal deficit present.      Mental Status: He is alert and oriented to person, place, and time. Mental status is at baseline.      Cranial Nerves: No cranial nerve deficit.      Motor: No weakness.      Gait: Gait normal.      Deep Tendon Reflexes: Reflexes are normal and symmetric.   Psychiatric:         Mood and Affect: Mood normal.         Behavior: Behavior normal.         Thought Content: Thought content normal.         Judgment: Judgment normal.         Labs    Lab Results   Component Value Date    WBC 5.59 07/31/2024    HGB 9.3 (L) 07/31/2024    HCT 31.4 (L) 07/31/2024     07/31/2024       No results found for: "NA", "K", "CL", "CO2", "GLU", "BUN", "CREATININE", "CALCIUM", "PROT", "ALBUMIN", "BILITOT", "ALKPHOS", "AST", "ALT", "ANIONGAP"    Xrays        Impression/Plan    Problem List Items Addressed This Visit          Pulmonary    Chronic cough - Primary     Studies are c/w cough variant asthma  Trial symbicort and prn albuterol  Prednisone taper  He is traveling to [place] next week  RTC 1 month            Other    HOA (obstructive sleep apnea)     Will follow  Ideally we want to get him back on his therapy   "                Bulmaro Montoya MD

## 2024-10-09 ENCOUNTER — LAB VISIT (OUTPATIENT)
Dept: LAB | Facility: HOSPITAL | Age: 69
End: 2024-10-09
Attending: INTERNAL MEDICINE
Payer: COMMERCIAL

## 2024-10-09 ENCOUNTER — OFFICE VISIT (OUTPATIENT)
Dept: PULMONOLOGY | Facility: CLINIC | Age: 69
End: 2024-10-09
Payer: COMMERCIAL

## 2024-10-09 VITALS
SYSTOLIC BLOOD PRESSURE: 160 MMHG | WEIGHT: 242 LBS | HEART RATE: 92 BPM | OXYGEN SATURATION: 97 % | DIASTOLIC BLOOD PRESSURE: 80 MMHG

## 2024-10-09 DIAGNOSIS — R05.3 CHRONIC COUGH: Primary | ICD-10-CM

## 2024-10-09 DIAGNOSIS — I43 DILATED CARDIOMYOPATHY SECONDARY TO SENSITIVITY: ICD-10-CM

## 2024-10-09 DIAGNOSIS — T78.40XA DILATED CARDIOMYOPATHY SECONDARY TO SENSITIVITY: ICD-10-CM

## 2024-10-09 DIAGNOSIS — T78.40XA ALLERGY, INITIAL ENCOUNTER: ICD-10-CM

## 2024-10-09 PROCEDURE — 1101F PT FALLS ASSESS-DOCD LE1/YR: CPT | Mod: CPTII,S$GLB,, | Performed by: INTERNAL MEDICINE

## 2024-10-09 PROCEDURE — 3077F SYST BP >= 140 MM HG: CPT | Mod: CPTII,S$GLB,, | Performed by: INTERNAL MEDICINE

## 2024-10-09 PROCEDURE — 3079F DIAST BP 80-89 MM HG: CPT | Mod: CPTII,S$GLB,, | Performed by: INTERNAL MEDICINE

## 2024-10-09 PROCEDURE — 3288F FALL RISK ASSESSMENT DOCD: CPT | Mod: CPTII,S$GLB,, | Performed by: INTERNAL MEDICINE

## 2024-10-09 PROCEDURE — 82785 ASSAY OF IGE: CPT | Performed by: INTERNAL MEDICINE

## 2024-10-09 PROCEDURE — 36415 COLL VENOUS BLD VENIPUNCTURE: CPT | Performed by: INTERNAL MEDICINE

## 2024-10-09 PROCEDURE — 1125F AMNT PAIN NOTED PAIN PRSNT: CPT | Mod: CPTII,S$GLB,, | Performed by: INTERNAL MEDICINE

## 2024-10-09 PROCEDURE — 1159F MED LIST DOCD IN RCRD: CPT | Mod: CPTII,S$GLB,, | Performed by: INTERNAL MEDICINE

## 2024-10-09 PROCEDURE — 1160F RVW MEDS BY RX/DR IN RCRD: CPT | Mod: CPTII,S$GLB,, | Performed by: INTERNAL MEDICINE

## 2024-10-09 PROCEDURE — 99214 OFFICE O/P EST MOD 30 MIN: CPT | Mod: S$GLB,,, | Performed by: INTERNAL MEDICINE

## 2024-10-09 PROCEDURE — 86003 ALLG SPEC IGE CRUDE XTRC EA: CPT | Mod: 59 | Performed by: INTERNAL MEDICINE

## 2024-10-09 RX ORDER — MONTELUKAST SODIUM 10 MG/1
10 TABLET ORAL NIGHTLY
Qty: 30 TABLET | Refills: 1 | Status: SHIPPED | OUTPATIENT
Start: 2024-10-09

## 2024-10-09 NOTE — PROGRESS NOTES
Office Visit Note *    Patient Name: Timbo Torres  MRN: [id number]  : 1955      Reason for visit: Cough, chronic     HPI:     2024 - He had URI in December and since then has had cough,  it has been stable over this period but not better.  He has some phlegm in the AM but mostly nonproductive.  He has postnasal drip (takes claritin most days, no nasal sprays).  No h/o asthma.  He did take allergy shots when he was younger.  No h/o smoking.  He has not had any recent CXR.  He has been on several inhalers with no help, he has not been on steroids but has noted some SOB with exertion.  No prior similar episode.  He does have Meenu and has CPAP but has trouble tolerating nCPAP due to nasal congestion.  He did have Covid about 2 months ago and was treated with paxlovid.      2024 - Here to go over tests, IgE normal, CBC OK, CT siuses with some inflammation, PF with mild restriction and mild decrease DLCO, + methacholine challenge and he noted increased cough during and after the test.  He has tried BREO without help.     10/9/2024 - Here for follow up, he has returned from [place] and his symptoms were much better there and since returning they are returning.  He did not notice any difference with BREO.  He has not tolerated nasal sprays in the past.  He is still taking a long acting antihistamine.  Reports + allergy testing as a child and was desensitized.      Past Medical History    Past Medical History:   Diagnosis Date    Chronic sinusitis, unspecified     CKD (chronic kidney disease)     Diabetes mellitus     Mixed hyperlipidemia     Sleep apnea, unspecified        Past Surgical History    Past Surgical History:   Procedure Laterality Date    CHOLECYSTECTOMY      ROTATOR CUFF REPAIR Left     TOTAL HIP ARTHROPLASTY Right     UVULOPALATOPHARYNGOPLASTY         Medications      Current Outpatient Medications:     albuterol (VENTOLIN HFA) 90 mcg/actuation inhaler, Inhale 2 puffs into the lungs every  6 (six) hours as needed for Wheezing. Rescue, Disp: 18 g, Rfl: 5    budesonide-formoterol 160-4.5 mcg (SYMBICORT) 160-4.5 mcg/actuation HFAA, Inhale 2 puffs into the lungs every 12 (twelve) hours. Controller, Disp: 10.2 g, Rfl: 5    colesevelam (WELCHOL) 625 mg tablet, Take 1,250 mg by mouth 2 (two) times daily., Disp: , Rfl:     FARXIGA 10 mg tablet, Take 10 mg by mouth., Disp: , Rfl:     fenofibrate (TRICOR) 145 MG tablet, fenofibrate nanocrystallized 145 mg tablet, Disp: , Rfl:     pioglitazone (ACTOS) 30 MG tablet, Take 30 mg by mouth., Disp: , Rfl:     semaglutide (RYBELSUS) 7 mg tablet, Rybelsus 7 mg tablet, Disp: , Rfl:     acetaminophen 325 mg Cap, acetaminophen 325 mg capsule  Take 2 capsules every 4-6 hours by oral route. (Patient not taking: Reported on 10/9/2024), Disp: , Rfl:     amoxicillin (AMOXIL) 500 MG Tab, amoxicillin 500 mg tablet  TK 1 T PO  Q 8 H TAT (Patient not taking: Reported on 10/9/2024), Disp: , Rfl:     atorvastatin (LIPITOR) 10 MG tablet, atorvastatin 10 mg tablet (Patient not taking: Reported on 10/9/2024), Disp: , Rfl:     azelastine (ASTELIN) 137 mcg (0.1 %) nasal spray, azelastine 137 mcg (0.1 %) nasal spray aerosol  USE 1 SPRAY IN EACH NOSTRIL TWICE DAILY (Patient not taking: Reported on 10/9/2024), Disp: , Rfl:     azithromycin (Z-WENDY) 250 MG tablet, azithromycin 250 mg tablet (Patient not taking: Reported on 10/9/2024), Disp: , Rfl:     cephALEXin (KEFLEX) 500 MG capsule, cephalexin 500 mg capsule (Patient not taking: Reported on 10/9/2024), Disp: , Rfl:     cetirizine (ZYRTEC) 10 MG tablet, cetirizine 10 mg tablet  TAKE 1 TABLET BY MOUTH EVERY EVENING (Patient not taking: Reported on 10/9/2024), Disp: , Rfl:     esomeprazole (NEXIUM) 40 MG capsule, esomeprazole magnesium 40 mg capsule,delayed release (Patient not taking: Reported on 10/9/2024), Disp: , Rfl:     flu vac 2020 65up-[id number],PF, (FLUAD QUAD 2020-21,65Y UP,,PF,) 60 mcg (15 mcg x 4)/0.5 mL Syrg, Fluad Quad  8345-0220(65yr up)(PF) 60 mcg (15 mcg x 4)/0.5mL IM syringe (Patient not taking: Reported on 10/9/2024), Disp: , Rfl:     gabapentin (NEURONTIN) 300 MG capsule, gabapentin 300 mg capsule (Patient not taking: Reported on 10/9/2024), Disp: , Rfl:     guaiFENesin-codeine 100-10 mg/5 ml (TUSSI-ORGANIDIN NR)  mg/5 mL syrup, Take by mouth. (Patient not taking: Reported on 10/9/2024), Disp: , Rfl:     HYDROcodone-acetaminophen (NORCO)  mg per tablet, hydrocodone 10 mg-acetaminophen 325 mg tablet (Patient not taking: Reported on 10/9/2024), Disp: , Rfl:     meloxicam (MOBIC) 15 MG tablet, Take 15 mg by mouth daily as needed. (Patient not taking: Reported on 10/9/2024), Disp: , Rfl:     metFORMIN (GLUCOPHAGE-XR) 500 MG ER 24hr tablet, metformin  mg tablet,extended release 24 hr (Patient not taking: Reported on 10/9/2024), Disp: , Rfl:     montelukast (SINGULAIR) 10 mg tablet, Take 1 tablet (10 mg total) by mouth every evening., Disp: 30 tablet, Rfl: 1    pantoprazole (PROTONIX) 20 MG tablet, Take 20 mg by mouth. (Patient not taking: Reported on 10/9/2024), Disp: , Rfl:     predniSONE (DELTASONE) 10 MG tablet, Take 3 a day x 5 days then 2 a day x 5 days then 1 a day x 5 days (Patient not taking: Reported on 10/9/2024), Disp: 30 tablet, Rfl: 0    promethazine-dextromethorphan (PROMETHAZINE-DM) 6.25-15 mg/5 mL Syrp, promethazine-DM 6.25 mg-15 mg/5 mL oral syrup  TAKE 5 ML BY MOUTH EVERY 6 TO 8 HOURS AS NEEDED FOR COUGH (Patient not taking: Reported on 10/9/2024), Disp: , Rfl:     SITagliptan-metformin (JANUMET)  mg per tablet, Janumet 50 mg-500 mg tablet (Patient not taking: Reported on 10/9/2024), Disp: , Rfl:     traMADoL (ULTRAM) 50 mg tablet, tramadol 50 mg tablet (Patient not taking: Reported on 10/9/2024), Disp: , Rfl:     trospium (SANCTURA) 20 mg Tab tablet, trospium 20 mg tablet (Patient not taking: Reported on 10/9/2024), Disp: , Rfl:     Allergies    Review of patient's allergies  indicates:  No Known Allergies    SocHx    Social History     Tobacco Use   Smoking Status Never   Smokeless Tobacco Never       Social History     Substance and Sexual Activity   Alcohol Use Not Currently       Drug Use - no  Occupation -   Asbestos exposure - yes  Pets - dog    FMHx    No family history on file.      Review of Systems  Review of Systems   Constitutional:  Negative for chills, diaphoresis, fever, malaise/fatigue and weight loss.        + sleep apnea   HENT:  Positive for congestion.         S/p UPPP   Eyes:  Negative for pain.   Respiratory:  Positive for cough, sputum production and shortness of breath. Negative for hemoptysis, wheezing and stridor.    Cardiovascular:  Negative for chest pain, palpitations, orthopnea, claudication, leg swelling and PND.   Gastrointestinal:  Negative for abdominal pain, constipation, diarrhea, heartburn, nausea and vomiting.   Genitourinary:  Negative for dysuria, frequency and urgency.   Musculoskeletal:  Negative for falls and myalgias.   Neurological:  Negative for sensory change, focal weakness and weakness.   Psychiatric/Behavioral:  Negative for depression, substance abuse and suicidal ideas. The patient is not nervous/anxious.        Physical Exam    Vitals:    10/09/24 1258   BP: (!) 160/80   BP Location: Left arm   Patient Position: Sitting   Pulse: 92   SpO2: 97%   Weight: 109.8 kg (242 lb)       Physical Exam  Vitals and nursing note reviewed.   Constitutional:       General: He is not in acute distress.     Appearance: Normal appearance. He is well-developed. He is not ill-appearing, toxic-appearing or diaphoretic.   HENT:      Head: Normocephalic and atraumatic.      Right Ear: External ear normal.      Left Ear: External ear normal.      Nose: Congestion present.      Comments: + postnasal drip     Mouth/Throat:      Mouth: Mucous membranes are moist.      Pharynx: Oropharynx is clear.      Comments: S/p UPPP  Eyes:      General: No scleral  "icterus.        Right eye: No discharge.         Left eye: No discharge.      Extraocular Movements: Extraocular movements intact.      Conjunctiva/sclera: Conjunctivae normal.      Pupils: Pupils are equal, round, and reactive to light.   Neck:      Thyroid: No thyromegaly.      Vascular: No JVD.      Trachea: No tracheal deviation.   Cardiovascular:      Rate and Rhythm: Normal rate and regular rhythm.      Heart sounds: Normal heart sounds. No murmur heard.     No friction rub. No gallop.   Pulmonary:      Effort: Pulmonary effort is normal. No respiratory distress.      Breath sounds: Normal breath sounds. No stridor. No wheezing, rhonchi or rales.   Chest:      Chest wall: No tenderness.   Abdominal:      General: Bowel sounds are normal. There is no distension.      Palpations: Abdomen is soft.      Tenderness: There is no abdominal tenderness. There is no guarding.   Musculoskeletal:         General: No tenderness. Normal range of motion.      Cervical back: Normal range of motion and neck supple. No rigidity or tenderness.      Right lower leg: No edema.      Left lower leg: No edema.   Lymphadenopathy:      Cervical: No cervical adenopathy.   Skin:     General: Skin is warm.      Capillary Refill: Capillary refill takes less than 2 seconds.   Neurological:      General: No focal deficit present.      Mental Status: He is alert and oriented to person, place, and time. Mental status is at baseline.      Cranial Nerves: No cranial nerve deficit.      Motor: No weakness.      Gait: Gait normal.      Deep Tendon Reflexes: Reflexes are normal and symmetric.   Psychiatric:         Mood and Affect: Mood normal.         Behavior: Behavior normal.         Thought Content: Thought content normal.         Judgment: Judgment normal.         Labs    Lab Results   Component Value Date    WBC 5.59 07/31/2024    HGB 9.3 (L) 07/31/2024    HCT 31.4 (L) 07/31/2024     07/31/2024       No results found for: "NA", "K", " ""CL", "CO2", "GLU", "BUN", "CREATININE", "CALCIUM", "PROT", "ALBUMIN", "BILITOT", "ALKPHOS", "AST", "ALT", "ANIONGAP"    Xrays        Impression/Plan    Problem List Items Addressed This Visit          Pulmonary    Chronic cough - Primary     As above  ICS/LABA did not help  Will try singulair and flonase and see if it help  Check respiratory allergy profile  May need referral to allergist  RTC 1 month         Relevant Orders    Respiratory Allergy Profile [facility]       Other    Allergy    Relevant Orders    Respiratory Allergy Profile [facility]               Bulmaro Montoya MD        "

## 2024-10-09 NOTE — ASSESSMENT & PLAN NOTE
As above  ICS/LABA did not help  Will try singulair and flonase and see if it help  Check respiratory allergy profile  May need referral to allergist  RTC 1 month

## 2024-10-15 LAB
A ALTERNATA IGE QN: <0.1 KU/L
A FUMIGATUS IGE QN: <0.1 KU/L
ALLERGEN CLASS DESCRIPTION: NORMAL
AMER ROACH IGE QN: <0.1 KU/L
BAHIA GRASS IGE QN: <0.1 KU/L
BERMUDA GRASS IGE QN: <0.1 KU/L
BOXELDER IGE QN: <0.1 KU/L
C HERBARUM IGE QN: <0.1 KU/L
CAT DANDER IGE QN: <0.1 KU/L
CMN PIGWEED IGE QN: <0.1 KU/L
COMMON RAGWEED IGE QN: <0.1 KU/L
D FARINAE IGE QN: <0.1 KU/L
D PTERONYSS IGE QN: <0.1 KU/L
DOG DANDER IGE QN: <0.1 KU/L
ENGL PLANTAIN IGE QN: <0.1 KU/L
IGE: 8 IU/ML (ref 6–495)
JOHNSON GRASS IGE QN: <0.1 KU/L
LONDON PLANE IGE QN: <0.1 KU/L
M RACEMOSUS IGE QN: <0.1 KU/L
MT JUNIPER IGE QN: <0.1 KU/L
MUGWORT IGE QN: <0.1 KU/L
NETTLE IGE QN: <0.1 KU/L
P NOTATUM IGE QN: <0.1 KU/L
S BOTRYOSUM IGE QN: <0.1 KU/L
SHEEP SORREL IGE QN: <0.1 KU/L
SWEET GUM IGE QN: <0.1 KU/L
TIMOTHY IGE QN: <0.1 KU/L
WHITE ELM IGE QN: <0.1 KU/L
WHITE HICKORY IGE QN: <0.1 KU/L
WHITE MULBERRY IGE QN: <0.1 KU/L
WHITE OAK IGE QN: <0.1 KU/L

## 2024-11-12 RX ORDER — DICYCLOMINE HYDROCHLORIDE 20 MG/1
1 TABLET ORAL EVERY 6 HOURS
COMMUNITY

## 2024-11-13 ENCOUNTER — OFFICE VISIT (OUTPATIENT)
Dept: PULMONOLOGY | Facility: CLINIC | Age: 69
End: 2024-11-13
Payer: COMMERCIAL

## 2024-11-13 VITALS — HEART RATE: 95 BPM | OXYGEN SATURATION: 96 % | WEIGHT: 240.19 LBS

## 2024-11-13 DIAGNOSIS — R05.3 CHRONIC COUGH: Primary | ICD-10-CM

## 2024-11-13 PROCEDURE — 1160F RVW MEDS BY RX/DR IN RCRD: CPT | Mod: CPTII,S$GLB,, | Performed by: INTERNAL MEDICINE

## 2024-11-13 PROCEDURE — 1126F AMNT PAIN NOTED NONE PRSNT: CPT | Mod: CPTII,S$GLB,, | Performed by: INTERNAL MEDICINE

## 2024-11-13 PROCEDURE — 99214 OFFICE O/P EST MOD 30 MIN: CPT | Mod: S$GLB,,, | Performed by: INTERNAL MEDICINE

## 2024-11-13 PROCEDURE — 1159F MED LIST DOCD IN RCRD: CPT | Mod: CPTII,S$GLB,, | Performed by: INTERNAL MEDICINE

## 2024-11-13 NOTE — PROGRESS NOTES
Office Visit Note *    Patient Name: Timbo Torres  MRN: [id number]  : 1955      Reason for visit: Cough, chronic     HPI:     2024 - He had URI in December and since then has had cough,  it has been stable over this period but not better.  He has some phlegm in the AM but mostly nonproductive.  He has postnasal drip (takes claritin most days, no nasal sprays).  No h/o asthma.  He did take allergy shots when he was younger.  No h/o smoking.  He has not had any recent CXR.  He has been on several inhalers with no help, he has not been on steroids but has noted some SOB with exertion.  No prior similar episode.  He does have Meenu and has CPAP but has trouble tolerating nCPAP due to nasal congestion.  He did have Covid about 2 months ago and was treated with paxlovid.      2024 - Here to go over tests, IgE normal, CBC OK, CT siuses with some inflammation, PF with mild restriction and mild decrease DLCO, + methacholine challenge and he noted increased cough during and after the test.  He has tried BREO without help.     10/9/2024 - Here for follow up, he has returned from [place] and his symptoms were much better there and since returning they are returning.  He did not notice any difference with BREO.  He has not tolerated nasal sprays in the past.  He is still taking a long acting antihistamine.  Reports + allergy testing as a child and was desensitized.     2024 - Here for follow up, has had a rather dramatic response to using montekulast cough is very much better and we will continue.  No new issues reported.       Past Medical History    Past Medical History:   Diagnosis Date    Chronic sinusitis, unspecified     CKD (chronic kidney disease)     Diabetes mellitus     Mixed hyperlipidemia     Sleep apnea, unspecified        Past Surgical History    Past Surgical History:   Procedure Laterality Date    CHOLECYSTECTOMY      ROTATOR CUFF REPAIR Left     TOTAL HIP ARTHROPLASTY Right      UVULOPALATOPHARYNGOPLASTY         Medications      Current Outpatient Medications:     albuterol (VENTOLIN HFA) 90 mcg/actuation inhaler, Inhale 2 puffs into the lungs every 6 (six) hours as needed for Wheezing. Rescue, Disp: 18 g, Rfl: 5    colesevelam (WELCHOL) 625 mg tablet, Take 1,250 mg by mouth 2 (two) times daily., Disp: , Rfl:     dicyclomine (BENTYL) 20 mg tablet, Take 1 tablet by mouth every 6 (six) hours., Disp: , Rfl:     FARXIGA 10 mg tablet, Take 10 mg by mouth., Disp: , Rfl:     fenofibrate (TRICOR) 145 MG tablet, fenofibrate nanocrystallized 145 mg tablet, Disp: , Rfl:     montelukast (SINGULAIR) 10 mg tablet, Take 1 tablet (10 mg total) by mouth every evening., Disp: 30 tablet, Rfl: 1    pioglitazone (ACTOS) 30 MG tablet, Take 30 mg by mouth., Disp: , Rfl:     semaglutide (RYBELSUS) 7 mg tablet, Rybelsus 7 mg tablet, Disp: , Rfl:     acetaminophen 325 mg Cap, acetaminophen 325 mg capsule  Take 2 capsules every 4-6 hours by oral route. (Patient not taking: Reported on 7/31/2024), Disp: , Rfl:     amoxicillin (AMOXIL) 500 MG Tab, amoxicillin 500 mg tablet  TK 1 T PO  Q 8 H TAT (Patient not taking: Reported on 7/31/2024), Disp: , Rfl:     atorvastatin (LIPITOR) 10 MG tablet, atorvastatin 10 mg tablet (Patient not taking: Reported on 7/31/2024), Disp: , Rfl:     azelastine (ASTELIN) 137 mcg (0.1 %) nasal spray, azelastine 137 mcg (0.1 %) nasal spray aerosol  USE 1 SPRAY IN EACH NOSTRIL TWICE DAILY (Patient not taking: Reported on 7/31/2024), Disp: , Rfl:     azithromycin (Z-WENDY) 250 MG tablet, azithromycin 250 mg tablet (Patient not taking: Reported on 7/31/2024), Disp: , Rfl:     budesonide-formoterol 160-4.5 mcg (SYMBICORT) 160-4.5 mcg/actuation HFAA, Inhale 2 puffs into the lungs every 12 (twelve) hours. Controller (Patient not taking: Reported on 11/13/2024), Disp: 10.2 g, Rfl: 5    cephALEXin (KEFLEX) 500 MG capsule, cephalexin 500 mg capsule (Patient not taking: Reported on 7/31/2024), Disp: ,  Rfl:     cetirizine (ZYRTEC) 10 MG tablet, cetirizine 10 mg tablet  TAKE 1 TABLET BY MOUTH EVERY EVENING (Patient not taking: Reported on 7/31/2024), Disp: , Rfl:     esomeprazole (NEXIUM) 40 MG capsule, esomeprazole magnesium 40 mg capsule,delayed release (Patient not taking: Reported on 7/31/2024), Disp: , Rfl:     flu vac 2020 65up-[id number],PF, (FLUAD QUAD 2020-21,65Y UP,,PF,) 60 mcg (15 mcg x 4)/0.5 mL Syrg, Fluad Quad 0817-1947(65yr up)(PF) 60 mcg (15 mcg x 4)/0.5mL IM syringe (Patient not taking: Reported on 7/31/2024), Disp: , Rfl:     gabapentin (NEURONTIN) 300 MG capsule, gabapentin 300 mg capsule (Patient not taking: Reported on 7/31/2024), Disp: , Rfl:     guaiFENesin-codeine 100-10 mg/5 ml (TUSSI-ORGANIDIN NR)  mg/5 mL syrup, Take by mouth. (Patient not taking: Reported on 7/31/2024), Disp: , Rfl:     HYDROcodone-acetaminophen (NORCO)  mg per tablet, hydrocodone 10 mg-acetaminophen 325 mg tablet (Patient not taking: Reported on 7/31/2024), Disp: , Rfl:     meloxicam (MOBIC) 15 MG tablet, Take 15 mg by mouth daily as needed. (Patient not taking: Reported on 7/31/2024), Disp: , Rfl:     metFORMIN (GLUCOPHAGE-XR) 500 MG ER 24hr tablet, metformin  mg tablet,extended release 24 hr (Patient not taking: Reported on 7/31/2024), Disp: , Rfl:     pantoprazole (PROTONIX) 20 MG tablet, Take 20 mg by mouth. (Patient not taking: Reported on 7/31/2024), Disp: , Rfl:     predniSONE (DELTASONE) 10 MG tablet, Take 3 a day x 5 days then 2 a day x 5 days then 1 a day x 5 days (Patient not taking: Reported on 11/13/2024), Disp: 30 tablet, Rfl: 0    promethazine-dextromethorphan (PROMETHAZINE-DM) 6.25-15 mg/5 mL Syrp, promethazine-DM 6.25 mg-15 mg/5 mL oral syrup  TAKE 5 ML BY MOUTH EVERY 6 TO 8 HOURS AS NEEDED FOR COUGH (Patient not taking: Reported on 7/31/2024), Disp: , Rfl:     SITagliptan-metformin (JANUMET)  mg per tablet, Janumet 50 mg-500 mg tablet (Patient not taking: Reported on 7/31/2024),  Disp: , Rfl:     traMADoL (ULTRAM) 50 mg tablet, tramadol 50 mg tablet (Patient not taking: Reported on 7/31/2024), Disp: , Rfl:     trospium (SANCTURA) 20 mg Tab tablet, trospium 20 mg tablet (Patient not taking: Reported on 7/31/2024), Disp: , Rfl:     Allergies    Review of patient's allergies indicates:  No Known Allergies    SocHx    Social History     Tobacco Use   Smoking Status Never   Smokeless Tobacco Never       Social History     Substance and Sexual Activity   Alcohol Use Not Currently       Drug Use - no  Occupation -   Asbestos exposure - yes  Pets - dog    FMHx    No family history on file.      Review of Systems  Review of Systems   Constitutional:  Negative for chills, diaphoresis, fever, malaise/fatigue and weight loss.        + sleep apnea   HENT:  Positive for congestion.         S/p UPPP   Eyes:  Negative for pain.   Respiratory:  Positive for cough, sputum production and shortness of breath. Negative for hemoptysis, wheezing and stridor.    Cardiovascular:  Negative for chest pain, palpitations, orthopnea, claudication, leg swelling and PND.   Gastrointestinal:  Negative for abdominal pain, constipation, diarrhea, heartburn, nausea and vomiting.   Genitourinary:  Negative for dysuria, frequency and urgency.   Musculoskeletal:  Negative for falls and myalgias.   Neurological:  Negative for sensory change, focal weakness and weakness.   Psychiatric/Behavioral:  Negative for depression, substance abuse and suicidal ideas. The patient is not nervous/anxious.        Physical Exam    Vitals:    11/13/24 1311   BP: (!) (P) 140/78   BP Location: Left arm   Patient Position: Sitting   Pulse: 95   SpO2: 96%   Weight: 109 kg (240 lb 3.2 oz)       Physical Exam  Vitals and nursing note reviewed.   Constitutional:       General: He is not in acute distress.     Appearance: Normal appearance. He is well-developed. He is not ill-appearing, toxic-appearing or diaphoretic.   HENT:      Head:  Normocephalic and atraumatic.      Right Ear: External ear normal.      Left Ear: External ear normal.      Nose: Congestion present.      Comments: + postnasal drip     Mouth/Throat:      Mouth: Mucous membranes are moist.      Pharynx: Oropharynx is clear.      Comments: S/p UPPP  Eyes:      General: No scleral icterus.        Right eye: No discharge.         Left eye: No discharge.      Extraocular Movements: Extraocular movements intact.      Conjunctiva/sclera: Conjunctivae normal.      Pupils: Pupils are equal, round, and reactive to light.   Neck:      Thyroid: No thyromegaly.      Vascular: No JVD.      Trachea: No tracheal deviation.   Cardiovascular:      Rate and Rhythm: Normal rate and regular rhythm.      Heart sounds: Normal heart sounds. No murmur heard.     No friction rub. No gallop.   Pulmonary:      Effort: Pulmonary effort is normal. No respiratory distress.      Breath sounds: Normal breath sounds. No stridor. No wheezing, rhonchi or rales.   Chest:      Chest wall: No tenderness.   Abdominal:      General: Bowel sounds are normal. There is no distension.      Palpations: Abdomen is soft.      Tenderness: There is no abdominal tenderness. There is no guarding.   Musculoskeletal:         General: No tenderness. Normal range of motion.      Cervical back: Normal range of motion and neck supple. No rigidity or tenderness.      Right lower leg: No edema.      Left lower leg: No edema.   Lymphadenopathy:      Cervical: No cervical adenopathy.   Skin:     General: Skin is warm.      Capillary Refill: Capillary refill takes less than 2 seconds.   Neurological:      General: No focal deficit present.      Mental Status: He is alert and oriented to person, place, and time. Mental status is at baseline.      Cranial Nerves: No cranial nerve deficit.      Motor: No weakness.      Gait: Gait normal.      Deep Tendon Reflexes: Reflexes are normal and symmetric.   Psychiatric:         Mood and Affect: Mood  "normal.         Behavior: Behavior normal.         Thought Content: Thought content normal.         Judgment: Judgment normal.         Labs    Lab Results   Component Value Date    WBC 5.59 07/31/2024    HGB 9.3 (L) 07/31/2024    HCT 31.4 (L) 07/31/2024     07/31/2024       No results found for: "NA", "K", "CL", "CO2", "GLU", "BUN", "CREATININE", "CALCIUM", "PROT", "ALBUMIN", "BILITOT", "ALKPHOS", "AST", "ALT", "ANIONGAP"    Xrays        Impression/Plan    Problem List Items Addressed This Visit          Pulmonary    Chronic cough - Primary     Better with singulair  Will continue and see how he does   RTC 3 months                      Bulmaro Montoya MD          "